# Patient Record
Sex: FEMALE | Race: WHITE | NOT HISPANIC OR LATINO | Employment: UNEMPLOYED | URBAN - METROPOLITAN AREA
[De-identification: names, ages, dates, MRNs, and addresses within clinical notes are randomized per-mention and may not be internally consistent; named-entity substitution may affect disease eponyms.]

---

## 2018-01-01 ENCOUNTER — HISTORICAL (OUTPATIENT)
Dept: ADMINISTRATIVE | Facility: HOSPITAL | Age: 23
End: 2018-01-01

## 2018-01-03 LAB — FINAL CULTURE: NORMAL

## 2018-01-07 LAB
FINAL CULTURE: NORMAL
FINAL CULTURE: NORMAL

## 2019-02-19 DIAGNOSIS — O99.112 LUPUS ANTICOAGULANT AFFECTING PREGNANCY IN SECOND TRIMESTER, ANTEPARTUM: Primary | ICD-10-CM

## 2019-02-19 DIAGNOSIS — D68.62 LUPUS ANTICOAGULANT AFFECTING PREGNANCY IN SECOND TRIMESTER, ANTEPARTUM: Primary | ICD-10-CM

## 2019-02-19 DIAGNOSIS — F19.10 SUBSTANCE ABUSE AFFECTING PREGNANCY IN SECOND TRIMESTER, ANTEPARTUM: ICD-10-CM

## 2019-02-19 DIAGNOSIS — O99.322 SUBSTANCE ABUSE AFFECTING PREGNANCY IN SECOND TRIMESTER, ANTEPARTUM: ICD-10-CM

## 2019-07-19 ENCOUNTER — HOSPITAL ENCOUNTER (EMERGENCY)
Facility: OTHER | Age: 24
Discharge: HOME | End: 2019-07-19
Attending: OBSTETRICS & GYNECOLOGY
Payer: MEDICAID

## 2019-07-19 VITALS
RESPIRATION RATE: 18 BRPM | DIASTOLIC BLOOD PRESSURE: 56 MMHG | WEIGHT: 196 LBS | TEMPERATURE: 98 F | OXYGEN SATURATION: 98 % | SYSTOLIC BLOOD PRESSURE: 104 MMHG | HEART RATE: 66 BPM

## 2019-07-19 DIAGNOSIS — G89.18 POST-OPERATIVE PAIN: ICD-10-CM

## 2019-07-19 DIAGNOSIS — Z98.891 S/P CESAREAN SECTION: Primary | ICD-10-CM

## 2019-07-19 PROCEDURE — 99284 EMERGENCY DEPT VISIT MOD MDM: CPT | Mod: ,,, | Performed by: OBSTETRICS & GYNECOLOGY

## 2019-07-19 PROCEDURE — 99284 PR EMERGENCY DEPT VISIT,LEVEL IV: ICD-10-PCS | Mod: ,,, | Performed by: OBSTETRICS & GYNECOLOGY

## 2019-07-19 PROCEDURE — 99284 EMERGENCY DEPT VISIT MOD MDM: CPT

## 2019-07-19 PROCEDURE — 25000003 PHARM REV CODE 250: Performed by: STUDENT IN AN ORGANIZED HEALTH CARE EDUCATION/TRAINING PROGRAM

## 2019-07-19 RX ORDER — IBUPROFEN 800 MG/1
800 TABLET ORAL EVERY 8 HOURS PRN
Qty: 30 TABLET | Refills: 2 | Status: SHIPPED | OUTPATIENT
Start: 2019-07-19 | End: 2019-07-19 | Stop reason: SDUPTHER

## 2019-07-19 RX ORDER — OXYCODONE AND ACETAMINOPHEN 5; 325 MG/1; MG/1
1 TABLET ORAL EVERY 4 HOURS PRN
Qty: 20 TABLET | Refills: 0 | Status: SHIPPED | OUTPATIENT
Start: 2019-07-19

## 2019-07-19 RX ORDER — OXYCODONE AND ACETAMINOPHEN 10; 325 MG/1; MG/1
1 TABLET ORAL EVERY 6 HOURS PRN
Qty: 20 TABLET | Refills: 0 | Status: SHIPPED | OUTPATIENT
Start: 2019-07-19 | End: 2019-07-19

## 2019-07-19 RX ORDER — OXYCODONE AND ACETAMINOPHEN 10; 325 MG/1; MG/1
1 TABLET ORAL ONCE
Status: COMPLETED | OUTPATIENT
Start: 2019-07-19 | End: 2019-07-19

## 2019-07-19 RX ORDER — IBUPROFEN 800 MG/1
800 TABLET ORAL EVERY 8 HOURS PRN
Qty: 30 TABLET | Refills: 2 | Status: SHIPPED | OUTPATIENT
Start: 2019-07-19

## 2019-07-19 RX ADMIN — OXYCODONE HYDROCHLORIDE AND ACETAMINOPHEN 1 TABLET: 10; 325 TABLET ORAL at 05:07

## 2019-07-19 NOTE — ED PROVIDER NOTES
Encounter Date: 2019       History     Chief Complaint   Patient presents with    Abdominal Pain     25 yo  2 weeks s/p repeat C/S @ 38 weeks for placental insufficiency presents with abdominal pain. She ran out of pain meds 3 days ago, and notes gradual increasing pain since. [place] is from [place], but is in [place] due to her daughter being at [email or web address] for heart problems. She has not been able to rest due to this. She had pre-gestational HTN that was well controlled on atenalol, and no other complications during pregnancy.   Denies any fevers, chills, vaginal bleeding        Review of patient's allergies indicates:   Allergen Reactions    Ceftin [cefuroxime axetil]      Past Medical History:   Diagnosis Date    History of Sjogren's disease     Lupus anticoagulant affecting pregnancy in second trimester, antepartum     PTSD (post-traumatic stress disorder)     RA (rheumatoid arthritis)      Past Surgical History:   Procedure Laterality Date    ANKLE FRACTURE SURGERY      x2     SECTION      DILATION AND CURETTAGE OF UTERUS  2016    IAB     Family History   Problem Relation Age of Onset    Diabetes Father     Hypertension Father     Heart disease Father      Social History     Tobacco Use    Smoking status: Former Smoker    Tobacco comment: stopped with pregnancy   Substance Use Topics    Alcohol use: No     Frequency: Never    Drug use: Yes     Types: Marijuana     Comment: non Rx methadone, previous UDS for oxycodone, THC, and Methadone     Review of Systems   Constitutional: Negative for chills and fever.   Respiratory: Negative for chest tightness and shortness of breath.    Cardiovascular: Negative for chest pain.   Gastrointestinal: Negative for abdominal distention, constipation, diarrhea, nausea, rectal pain and vomiting. Abdominal pain: lower pelvic pain.   Genitourinary: Negative for dysuria and hematuria.   Skin: Negative for pallor.        LTCS scar intact  with no erythema, healing well   Neurological: Negative for dizziness, light-headedness and headaches.   Psychiatric/Behavioral: Negative for agitation and behavioral problems.       Physical Exam     Initial Vitals [19 1629]   BP Pulse Resp Temp SpO2   108/66 64 16 98.2 °F (36.8 °C) 97 %      MAP       --         Physical Exam    Constitutional: She appears well-developed and well-nourished.   HENT:   Head: Normocephalic and atraumatic.   Eyes: EOM are normal. Pupils are equal, round, and reactive to light.   Neck: Normal range of motion.   Pulmonary/Chest: No respiratory distress.   Abdominal: Soft. Bowel sounds are normal. She exhibits no distension. There is tenderness (tender to palpation in lower quadrants. ).         ED Course   Procedures  Labs Reviewed - No data to display       Imaging Results    None          Medical Decision Making:   ED Management:  VSS  Pt overall stable, complaining of abdominal pain over LTCS. Incision clean, dry, intact, healing well.   Pt with increased stress and inability to relax after c/s due to child in NICU at [facility].   Will send home with refill for percocet 5 and ibuprofen 800mg PRN for pain control              Attending Attestation:   Physician Attestation Statement for Resident:  As the supervising MD   Physician Attestation Statement: I have personally seen and examined this patient.   I agree with the above history. -:   As the supervising MD I agree with the above PE.    As the supervising MD I agree with the above treatment, course, plan, and disposition.   -: Patient evaluated and found to be stable, agree with resident's assessment of post  pain with no evidence of endometritis or would complication and plan to discharge to home with follow up with her Ob/Gyn this week.  I was personally present during the critical portions of the procedure(s) performed by the resident and was immediately available in the ED to provide services and assistance as  needed during the entire procedure.  I have reviewed the following: old records at this facility.                       Clinical Impression:       ICD-10-CM ICD-9-CM   1. S/P  section Z98.891 V45.89   2. Post-operative pain G89.18 338.18                                Merry Talley MD  Resident  [id number]       Kiarra Shaw MD  19 [id number]

## 2019-07-19 NOTE — DISCHARGE INSTRUCTIONS
Contact your primary OB or after hours at [phone] if you experience any of the following:  If you experience a constant headache, blurry vision, pain underneath your right rib, or sudden swelling of your hands, feet, and face.   Maintain all follow-up appointments.

## 2019-07-19 NOTE — ED TRIAGE NOTES
Pt presents to [facility] c/o pain related to C/S incision. Pt states she had a c/s at [facility] 2 weeks ago. She is in [place] because her baby is being cared for at [facility] due to a heart issue. She states that she called her OB and he directed her to her nearest labor and delivery unit for evaluation.MD notified. Will continue to anil.

## 2022-06-26 PROBLEM — M32.9 LUPUS: Status: ACTIVE | Noted: 2022-06-26

## 2022-06-26 PROBLEM — M06.9 RHEUMATOID ARTHRITIS: Status: ACTIVE | Noted: 2022-06-26

## 2022-06-26 PROBLEM — F43.10 POSTTRAUMATIC STRESS DISORDER: Status: ACTIVE | Noted: 2022-06-26

## 2022-06-26 PROBLEM — Z98.891 HISTORY OF CESAREAN SECTION: Status: ACTIVE | Noted: 2022-06-26

## 2022-06-26 PROBLEM — M79.7 FIBROMYALGIA: Status: ACTIVE | Noted: 2022-06-26

## 2022-06-26 PROBLEM — M35.00 SJOGREN SYNDROME, UNSPECIFIED: Status: ACTIVE | Noted: 2022-06-26

## 2022-06-26 PROBLEM — O03.9 SPONTANEOUS ABORTION: Status: ACTIVE | Noted: 2022-06-26

## 2024-06-28 ENCOUNTER — ON-DEMAND VIRTUAL (OUTPATIENT)
Dept: URGENT CARE | Facility: CLINIC | Age: 29
End: 2024-06-28
Payer: MEDICAID

## 2024-06-28 NOTE — PROGRESS NOTES
Subjective:      Patient ID: Jeimy Duarte is a 29 y.o. female.    Vitals:  vitals were not taken for this visit.     Chief Complaint: No chief complaint on file.      Visit Type: TELE AUDIOVISUAL    Present with the patient at the time of consultation: TELEMED PRESENT WITH PATIENT: None    Past Medical History:   Diagnosis Date    History of Sjogren's disease     Lupus anticoagulant affecting pregnancy in second trimester, antepartum     PTSD (post-traumatic stress disorder)     RA (rheumatoid arthritis)      Past Surgical History:   Procedure Laterality Date    ANKLE FRACTURE SURGERY      x2     SECTION  2012    CHOLECYSTECTOMY  2022    DILATION AND CURETTAGE OF UTERUS  2016    IAB     Review of patient's allergies indicates:   Allergen Reactions    Cefuroxime axetil Anaphylaxis and Hives    Latex Itching, Rash and Swelling     Current Outpatient Medications on File Prior to Visit   Medication Sig Dispense Refill    albuterol (PROVENTIL/VENTOLIN HFA) 90 mcg/actuation inhaler Inhale into the lungs.      ALPRAZolam (XANAX) 0.5 MG tablet       cevimeline (EVOXAC) 30 mg capsule Take 30 mg by mouth 3 (three) times daily.      clindamycin (CLEOCIN) 300 MG capsule Take 300 mg by mouth 3 (three) times daily.      dextroamphetamine-amphetamine (ADDERALL) 15 mg tablet Take 15 mg by mouth 2 (two) times daily.      doxycycline (VIBRA-TABS) 100 MG tablet Take 100 mg by mouth 2 (two) times daily.      fluticasone propionate (FLONASE) 50 mcg/actuation nasal spray by Each Nostril route.      methylPREDNISolone (MEDROL DOSEPACK) 4 mg tablet Take by mouth.      potassium chloride SA (K-DUR,KLOR-CON) 20 MEQ tablet Take 20 mEq by mouth.      ZOLOFT 100 mg tablet        No current facility-administered medications on file prior to visit.     Family History   Problem Relation Name Age of Onset    Diabetes Father      Hypertension Father      Heart disease Father             [facility] Intake    2024  7:40 AM  CDT - Filed by Patient   What is your current physical address in the event of a medical emergency? [address] [place].   Are you able to take your vital signs? No   Please attach any relevant images or files          HPI  ROS     Objective:   The physical exam was conducted virtually.  Physical Exam    Assessment:     1. Issue of repeat prescription for medication        Plan:       Issue of repeat prescription for medication                     This encounter was created in error - please disregard.